# Patient Record
Sex: FEMALE | Race: WHITE | NOT HISPANIC OR LATINO | URBAN - METROPOLITAN AREA
[De-identification: names, ages, dates, MRNs, and addresses within clinical notes are randomized per-mention and may not be internally consistent; named-entity substitution may affect disease eponyms.]

---

## 2020-06-12 ENCOUNTER — OFFICE VISIT (OUTPATIENT)
Dept: URBAN - METROPOLITAN AREA CLINIC 40 | Facility: CLINIC | Age: 60
End: 2020-06-12

## 2020-06-17 ENCOUNTER — OFFICE VISIT (OUTPATIENT)
Dept: URBAN - METROPOLITAN AREA TELEHEALTH 2 | Facility: TELEHEALTH | Age: 60
End: 2020-06-17
Payer: COMMERCIAL

## 2020-06-17 ENCOUNTER — DASHBOARD ENCOUNTERS (OUTPATIENT)
Age: 60
End: 2020-06-17

## 2020-06-17 DIAGNOSIS — K21.9 GERD (GASTROESOPHAGEAL REFLUX DISEASE): ICD-10-CM

## 2020-06-17 DIAGNOSIS — K80.20 CHOLELITHIASIS: ICD-10-CM

## 2020-06-17 DIAGNOSIS — B18.2 CHRONIC HEPATITIS C WITHOUT HEPATIC COMA: ICD-10-CM

## 2020-06-17 PROBLEM — 444898006: Status: ACTIVE | Noted: 2020-06-17

## 2020-06-17 PROBLEM — 128302006: Status: ACTIVE | Noted: 2020-06-17

## 2020-06-17 PROCEDURE — 3017F COLORECTAL CA SCREEN DOC REV: CPT | Performed by: INTERNAL MEDICINE

## 2020-06-17 PROCEDURE — G9903 PT SCRN TBCO ID AS NON USER: HCPCS | Performed by: INTERNAL MEDICINE

## 2020-06-17 PROCEDURE — 1036F TOBACCO NON-USER: CPT | Performed by: INTERNAL MEDICINE

## 2020-06-17 PROCEDURE — G8417 CALC BMI ABV UP PARAM F/U: HCPCS | Performed by: INTERNAL MEDICINE

## 2020-06-17 PROCEDURE — G8427 DOCREV CUR MEDS BY ELIG CLIN: HCPCS | Performed by: INTERNAL MEDICINE

## 2020-06-17 PROCEDURE — 99214 OFFICE O/P EST MOD 30 MIN: CPT | Performed by: INTERNAL MEDICINE

## 2020-06-17 NOTE — HPI-TODAY'S VISIT:
Patient has had a history of chronic hepatitis C and returns today for follow-up previous testing showed greater than 7 million copies of the hepatitis C virus, genotype 2B she did complete 12 weeks of Epclusa therapy.  She underwent FibroSure testing which showed F1 status lab work from February 2020 showed ALT 37 bilirubin 0.7 overall she is feeling well although still complaining of fatigue.  She did undergo colonoscopy on 1/20/2020 were multiple sigmoid diverticuli were found and a 2 cm pedunculated distal sigmoid polyp was snared removed and retrieved, pathology being tubular adenoma she does have mild gastroesophageal reflux which she is treating with over-the-counter omeprazole she is swallowing well she complains of mild abdominal cramping in the morning but denies any significant abdominal pain.